# Patient Record
Sex: MALE | Race: BLACK OR AFRICAN AMERICAN | NOT HISPANIC OR LATINO | Employment: FULL TIME | URBAN - METROPOLITAN AREA
[De-identification: names, ages, dates, MRNs, and addresses within clinical notes are randomized per-mention and may not be internally consistent; named-entity substitution may affect disease eponyms.]

---

## 2017-07-24 ENCOUNTER — HISTORICAL (OUTPATIENT)
Dept: SURGERY | Facility: HOSPITAL | Age: 52
End: 2017-07-24

## 2017-07-24 LAB
APTT PPP: 28.5 SECOND(S) (ref 25–35)
INR PPP: 1.1 (ref 0–1.2)
PROTHROMBIN TIME: 11.3 SECOND(S) (ref 9–12)

## 2017-07-26 ENCOUNTER — HISTORICAL (OUTPATIENT)
Dept: ANESTHESIOLOGY | Facility: HOSPITAL | Age: 52
End: 2017-07-26

## 2019-01-23 ENCOUNTER — HISTORICAL (OUTPATIENT)
Dept: RADIOLOGY | Facility: HOSPITAL | Age: 54
End: 2019-01-23

## 2019-04-12 ENCOUNTER — HISTORICAL (OUTPATIENT)
Dept: RADIOLOGY | Facility: HOSPITAL | Age: 54
End: 2019-04-12

## 2019-05-17 LAB
ABS NEUT (OLG): 2.1 X10(3)/MCL (ref 1.5–6.9)
APPEARANCE, UA: CLEAR
BILIRUB UR QL STRIP: NEGATIVE
BUN SERPL-MCNC: 23 MG/DL (ref 10–20)
CALCIUM SERPL-MCNC: 9.4 MG/DL (ref 8–10.5)
CHLORIDE SERPL-SCNC: 108 MMOL/L (ref 100–108)
CO2 SERPL-SCNC: 29 MMOL/L (ref 21–35)
COLOR UR: NORMAL
CREAT SERPL-MCNC: 1.27 MG/DL (ref 0.7–1.3)
CREAT/UREA NIT SERPL: 18
ERYTHROCYTE [DISTWIDTH] IN BLOOD BY AUTOMATED COUNT: 13.6 % (ref 11.5–17)
GLUCOSE (UA): NEGATIVE
GLUCOSE SERPL-MCNC: 99 MG/DL (ref 75–116)
HCT VFR BLD AUTO: 43.6 % (ref 42–52)
HGB BLD-MCNC: 14.2 GM/DL (ref 14–18)
HGB UR QL STRIP: NEGATIVE
KETONES UR QL STRIP: NEGATIVE
LEUKOCYTE ESTERASE UR QL STRIP: NEGATIVE
MCH RBC QN AUTO: 28 PG (ref 27–34)
MCHC RBC AUTO-ENTMCNC: 33 GM/DL (ref 31–36)
MCV RBC AUTO: 86 FL (ref 80–99)
NITRITE UR QL STRIP: NEGATIVE
PH UR STRIP: 5.5 [PH]
PLATELET # BLD AUTO: 208 X10(3)/MCL (ref 140–400)
PMV BLD AUTO: 10.2 FL (ref 6.8–10)
POTASSIUM SERPL-SCNC: 4.7 MMOL/L (ref 3.6–5.2)
PROT UR QL STRIP: NEGATIVE
RBC # BLD AUTO: 5.07 X10(6)/MCL (ref 4.7–6.1)
SODIUM SERPL-SCNC: 142 MMOL/L (ref 135–145)
SP GR UR STRIP: >=1.03
UROBILINOGEN UR STRIP-ACNC: 0.2 EU/DL
WBC # SPEC AUTO: 5.7 X10(3)/MCL (ref 4.5–11.5)

## 2019-05-22 ENCOUNTER — HISTORICAL (OUTPATIENT)
Dept: ANESTHESIOLOGY | Facility: HOSPITAL | Age: 54
End: 2019-05-22

## 2020-11-03 ENCOUNTER — HISTORICAL (OUTPATIENT)
Dept: ANESTHESIOLOGY | Facility: HOSPITAL | Age: 55
End: 2020-11-03

## 2022-04-07 ENCOUNTER — HISTORICAL (OUTPATIENT)
Dept: ADMINISTRATIVE | Facility: HOSPITAL | Age: 57
End: 2022-04-07

## 2022-04-24 VITALS
WEIGHT: 235 LBS | OXYGEN SATURATION: 99 % | BODY MASS INDEX: 36.88 KG/M2 | SYSTOLIC BLOOD PRESSURE: 129 MMHG | HEIGHT: 67 IN | DIASTOLIC BLOOD PRESSURE: 78 MMHG

## 2022-04-30 NOTE — OP NOTE
PREOPERATIVE DIAGNOSIS:  Right knee meniscal tear.    POSTOPERATIVE DIAGNOSIS:  Right knee medial meniscal tear and a plica.    PROCEDURE:  Excision of the plica medially as well as trimming of a medial meniscal tear.    ANESTHESIA:  General.    COMPLICATIONS:  None    DESCRIPTION OF PROCEDURE:  The patient is 54 with pain in the knee.  He was brought to the OR, given IV antibiotics.  Inflow was established.  The knee was examined arthroscopically.  He had an inflamed plica medially.  He had a posterior horn medial meniscal tear which was trimmed to a stable rim.  His anterior and posterior cruciate ligaments were intact.  Lateral joint line was pristine.  He had a little chondral scuffing noted on the medial femoral condyle, but nothing unstable.  Following resection of the plica and the meniscus, the knee was irrigated, portals were closed with nylon.  Sterile dressing applied.  No complications.        ALVIN/CAMACHO   DD: 05/22/2019 0830   DT: 05/22/2019 0838  Job # [id number]

## 2022-04-30 NOTE — OP NOTE
Patient:   Michel Zimmerman            MRN: [id number]            FIN: [id number]               Age:   55 years     Sex:  Male     :  1965   Associated Diagnoses:   Personal history of colonic polyps   Author:   Aimee Gallo MD      Operative Note   Operative Information   Date/ Time:  11/3/2020 07:48:00.     Procedures Performed: Procedure Code   Colonoscopy, flexible; diagnostic, including collection of specimen(s) by brushing or washing, when performed (separate procedure) (09997)..     Indications: 55-year-old -American male with a previous history of colorectal polyps within the cecum in need of short interval colonoscopy..     Preoperative Diagnosis: Personal history of colonic polyps ([id number] Z86.010).     Postoperative Diagnosis: Personal history of colonic polyps ([id number] Z86.010).     Surgeon: Aimee Gallo MD.     Anesthesia: Intravenous MAC.     Speciman Removed: None.     Description of Procedure/Findings/    Complications: Patient was brought to the endoscopy suite laid in the left lateral decubitus position right set up.  Intravenous anesthesia was provided.  Digital rectal exam performed exhibiting good anal rectal tone and no masses.  An endoscope was then passed through the anus intubating the rectum and with gentle insufflation reaching the cecum.  Upon reaching the cecum pictures were taken the scope was then slowly withdrawn.  Overall the cecum ascending transverse descending and rectosigmoid colon all appear to be grossly normal without mass polyp or ulceration seen.  Scope was retroflexed revealing normal-appearing perianal mucosa no significant hemorrhoids.  Scope was returned to neutral position the colon was evacuated of air.  Patient was relieved of anesthesia stable condition transfer postanesthesia care unit..     Esimated blood loss: No blood loss.     Findings: Overall normal mucosa no new polyps.     Complications: None.     Notes: Repeat colonoscopy at a  5-year interval due to previous history of colorectal polyps.

## 2022-06-02 ENCOUNTER — HOSPITAL ENCOUNTER (OUTPATIENT)
Dept: RADIOLOGY | Facility: HOSPITAL | Age: 57
Discharge: HOME | End: 2022-06-02
Attending: FAMILY MEDICINE
Payer: COMMERCIAL

## 2022-06-02 DIAGNOSIS — M54.50 LOW BACK PAIN: ICD-10-CM

## 2022-06-02 PROCEDURE — 72110 X-RAY EXAM L-2 SPINE 4/>VWS: CPT | Mod: TC

## 2025-02-19 ENCOUNTER — HOSPITAL ENCOUNTER (OUTPATIENT)
Dept: RADIOLOGY | Facility: HOSPITAL | Age: 60
Discharge: HOME | End: 2025-02-19
Attending: FAMILY MEDICINE
Payer: COMMERCIAL

## 2025-02-19 DIAGNOSIS — M25.512 LEFT SHOULDER PAIN: ICD-10-CM

## 2025-02-19 PROCEDURE — 73030 X-RAY EXAM OF SHOULDER: CPT | Mod: TC,LT

## 2025-07-14 DIAGNOSIS — E04.9 NON-TOXIC NODULAR GOITER: Primary | ICD-10-CM

## 2025-07-22 ENCOUNTER — HOSPITAL ENCOUNTER (OUTPATIENT)
Dept: RADIOLOGY | Facility: HOSPITAL | Age: 60
Discharge: HOME | End: 2025-07-22
Attending: PEDIATRICS
Payer: COMMERCIAL

## 2025-07-22 DIAGNOSIS — E04.9 NON-TOXIC NODULAR GOITER: ICD-10-CM

## 2025-07-22 PROCEDURE — 76536 US EXAM OF HEAD AND NECK: CPT | Mod: TC
